# Patient Record
Sex: FEMALE | Race: WHITE | NOT HISPANIC OR LATINO | Employment: OTHER | URBAN - METROPOLITAN AREA
[De-identification: names, ages, dates, MRNs, and addresses within clinical notes are randomized per-mention and may not be internally consistent; named-entity substitution may affect disease eponyms.]

---

## 2021-08-10 ENCOUNTER — OFFICE VISIT (OUTPATIENT)
Dept: ENDOCRINOLOGY | Facility: CLINIC | Age: 61
End: 2021-08-10

## 2021-08-10 VITALS
SYSTOLIC BLOOD PRESSURE: 138 MMHG | BODY MASS INDEX: 47.66 KG/M2 | HEIGHT: 62 IN | OXYGEN SATURATION: 97 % | WEIGHT: 259 LBS | HEART RATE: 109 BPM | DIASTOLIC BLOOD PRESSURE: 86 MMHG

## 2021-08-10 DIAGNOSIS — E11.65 UNCONTROLLED TYPE 2 DIABETES MELLITUS WITH HYPERGLYCEMIA (HCC): Primary | ICD-10-CM

## 2021-08-10 LAB
GLUCOSE BLDC GLUCOMTR-MCNC: 181 MG/DL (ref 70–130)
HBA1C MFR BLD: 7.8 %

## 2021-08-10 PROCEDURE — 99204 OFFICE O/P NEW MOD 45 MIN: CPT | Performed by: INTERNAL MEDICINE

## 2021-08-10 PROCEDURE — 82947 ASSAY GLUCOSE BLOOD QUANT: CPT | Performed by: INTERNAL MEDICINE

## 2021-08-10 RX ORDER — GLYBURIDE 1.25 MG/1
TABLET ORAL
COMMUNITY
Start: 2021-07-21 | End: 2021-08-10

## 2021-08-10 RX ORDER — LORATADINE 10 MG/1
10 TABLET ORAL DAILY
COMMUNITY

## 2021-08-10 RX ORDER — HYDROCODONE BITARTRATE AND ACETAMINOPHEN 10; 325 MG/1; MG/1
TABLET ORAL
COMMUNITY
Start: 2021-07-21

## 2021-08-10 RX ORDER — ALBUTEROL SULFATE 2.5 MG/3ML
2.5 SOLUTION RESPIRATORY (INHALATION) EVERY 4 HOURS PRN
COMMUNITY

## 2021-08-10 RX ORDER — LANCETS 33 GAUGE
EACH MISCELLANEOUS
COMMUNITY
Start: 2021-07-23 | End: 2022-04-06 | Stop reason: SDUPTHER

## 2021-08-10 RX ORDER — NYSTATIN 100000 [USP'U]/G
POWDER TOPICAL
COMMUNITY
Start: 2021-07-19 | End: 2022-10-13

## 2021-08-10 RX ORDER — CLONAZEPAM 0.5 MG/1
TABLET ORAL
COMMUNITY
Start: 2021-06-23

## 2021-08-10 RX ORDER — BLOOD SUGAR DIAGNOSTIC
STRIP MISCELLANEOUS
COMMUNITY
Start: 2021-07-19

## 2021-08-10 RX ORDER — SUCRALFATE ORAL 1 G/10ML
SUSPENSION ORAL
COMMUNITY
Start: 2021-07-19

## 2021-08-10 RX ORDER — ALBUTEROL SULFATE 1.25 MG/3ML
1 SOLUTION RESPIRATORY (INHALATION) EVERY 6 HOURS PRN
COMMUNITY
End: 2022-04-18

## 2021-08-10 RX ORDER — ASPIRIN 81 MG/1
81 TABLET, CHEWABLE ORAL DAILY
COMMUNITY

## 2021-08-10 RX ORDER — HYDROXYZINE HYDROCHLORIDE 25 MG/1
25 TABLET, FILM COATED ORAL 3 TIMES DAILY PRN
COMMUNITY

## 2021-08-10 RX ORDER — CHLORAL HYDRATE 500 MG
CAPSULE ORAL
COMMUNITY

## 2021-08-10 RX ORDER — BUDESONIDE AND FORMOTEROL FUMARATE DIHYDRATE 160; 4.5 UG/1; UG/1
2 AEROSOL RESPIRATORY (INHALATION)
COMMUNITY
End: 2022-04-18 | Stop reason: SDUPTHER

## 2021-08-10 RX ORDER — DOCUSATE SODIUM 100 MG/1
100 CAPSULE, LIQUID FILLED ORAL 2 TIMES DAILY
COMMUNITY

## 2021-08-10 RX ORDER — GLIPIZIDE 5 MG/1
TABLET ORAL
Qty: 30 TABLET | Refills: 5 | Status: SHIPPED | OUTPATIENT
Start: 2021-08-10 | End: 2022-04-05

## 2021-08-10 RX ORDER — PANTOPRAZOLE SODIUM 40 MG/1
TABLET, DELAYED RELEASE ORAL
COMMUNITY
Start: 2021-07-19

## 2021-08-10 RX ORDER — TIOTROPIUM BROMIDE INHALATION SPRAY 3.12 UG/1
SPRAY, METERED RESPIRATORY (INHALATION)
COMMUNITY
Start: 2021-07-19 | End: 2022-04-05

## 2021-08-10 RX ORDER — LISINOPRIL 30 MG/1
20 TABLET ORAL DAILY
COMMUNITY
Start: 2021-07-19 | End: 2022-10-13

## 2021-09-12 PROBLEM — E11.65 UNCONTROLLED TYPE 2 DIABETES MELLITUS WITH HYPERGLYCEMIA (HCC): Status: ACTIVE | Noted: 2021-09-12

## 2022-04-05 ENCOUNTER — OFFICE VISIT (OUTPATIENT)
Dept: ENDOCRINOLOGY | Facility: CLINIC | Age: 62
End: 2022-04-05

## 2022-04-05 VITALS
HEART RATE: 91 BPM | DIASTOLIC BLOOD PRESSURE: 80 MMHG | WEIGHT: 262 LBS | SYSTOLIC BLOOD PRESSURE: 136 MMHG | HEIGHT: 64 IN | OXYGEN SATURATION: 96 % | BODY MASS INDEX: 44.73 KG/M2

## 2022-04-05 DIAGNOSIS — E78.2 MIXED HYPERLIPIDEMIA: ICD-10-CM

## 2022-04-05 DIAGNOSIS — E11.65 UNCONTROLLED TYPE 2 DIABETES MELLITUS WITH HYPERGLYCEMIA: Primary | ICD-10-CM

## 2022-04-05 LAB
EXPIRATION DATE: NORMAL
GLUCOSE BLDC GLUCOMTR-MCNC: 143 MG/DL (ref 70–130)
HBA1C MFR BLD: 9.1 %
Lab: NORMAL

## 2022-04-05 PROCEDURE — 99214 OFFICE O/P EST MOD 30 MIN: CPT | Performed by: INTERNAL MEDICINE

## 2022-04-05 PROCEDURE — 83036 HEMOGLOBIN GLYCOSYLATED A1C: CPT | Performed by: INTERNAL MEDICINE

## 2022-04-05 PROCEDURE — 82947 ASSAY GLUCOSE BLOOD QUANT: CPT | Performed by: INTERNAL MEDICINE

## 2022-04-05 PROCEDURE — 3046F HEMOGLOBIN A1C LEVEL >9.0%: CPT | Performed by: INTERNAL MEDICINE

## 2022-04-05 RX ORDER — METFORMIN HYDROCHLORIDE 500 MG/1
500 TABLET, EXTENDED RELEASE ORAL
Qty: 60 TABLET | Refills: 5 | Status: SHIPPED | OUTPATIENT
Start: 2022-04-05

## 2022-04-05 RX ORDER — INSULIN DEGLUDEC INJECTION 100 U/ML
12 INJECTION, SOLUTION SUBCUTANEOUS NIGHTLY
Qty: 15 ML | Refills: 0
Start: 2022-04-05 | End: 2022-05-02 | Stop reason: SDUPTHER

## 2022-04-05 RX ORDER — FAMOTIDINE 10 MG
10 TABLET ORAL 2 TIMES DAILY
COMMUNITY

## 2022-04-05 RX ORDER — PRAVASTATIN SODIUM 40 MG
40 TABLET ORAL NIGHTLY
Qty: 30 TABLET | Refills: 5 | Status: SHIPPED | OUTPATIENT
Start: 2022-04-05 | End: 2022-10-13

## 2022-04-05 NOTE — PROGRESS NOTES
Chief Complaint   Patient presents with   • Diabetes     Follow up        HPI:   Magdaleno Curtis is a 61 y.o.female who returns to endocrine clinic for follow-up evaluation and management of her Type 2 diabetes. Last visit 08/10/2021. Her history is as follows:    Pt is accompanied by her daughter today.    Interim Events:   - pt missed her 12/2021 appointment. Did not schedule f/u until this year.  - Has had COPD exacerbations requiring glucocorticoid treatment  - Pt has new PCP. Has had worsening glycemic control in the past few months.  Hemoglobin A1c in March 2022 was 9.6%.  Patient's PCP started Januvia 100 mg daily.     1) Type 2 DM with no known associated complications at this time:   - Diagnosed with diabetes in 2020.   - Was initially prescribed plain metformin by a former PCP but pt did not tolerate. She developed abdominal pain.  - She was then prescribed glipizide qAM + glyburide qhs by former PCP at that time. Pt stated she was having symptoms of abdominal pain so she stopped the glipizide but was not sure if the medication was the cause of her symptoms. On further review, Pt had chronic nausea, abdominal pain off of medication.    Initial Endocrine Visit: (08/10/2021)   - pt's Hgb A1C% was 7.8 in 08/2021  - Discussed treatment options. GLP-1 agonists not ideal as she has chronic Abd pain/ nausea.  After further discussion with the patient, she has chronic abdominal pain and nausea that occurred even off of glipizide.  Discussed that the sulfonylureas very unlikely the cause of any of her GI symptoms.  Patient was agreeable to trying low-dose glipizide again as well as samples of Januvia.   - pt missed her 12/2021 appointment. Did not schedule f/u until 2022. Pt did not take the Januvia samples I gave her in 08/2021.    - New PCP started Januvia in 2022. Hemoglobin A1c in March 2022 was 9.6%.     Current DM Medications:  - Januvia 100 mg daily    Glucometer Review:  200's - 300's throughout the  "day    DM Health Maintenance:  Ophtho: due  Podiatry: no recent visit  Monofilament / Foot exam: due  Lipids: (03/2022) Tchol 194, , HDL 38,  - is not on statin  Urine Microalb/Cr ratio: DUE  TSH: (03/2022) 3.030  CMP: (03/2022) Cr 0.82, eGFR 81, LFTs WNL  CBC: (03/2022) Hgb 14.2, WNL  Vit B12: (03/2022) 637    Other history:   - COPD on NC O2    Review of Systems   Constitutional: Positive for fatigue.   HENT: Negative.    Eyes: Negative.    Respiratory: Positive for shortness of breath and wheezing.    Cardiovascular: Negative.    Gastrointestinal: Positive for abdominal pain, constipation and nausea.        Reflux/heartburn   Endocrine: Positive for cold intolerance, heat intolerance, polydipsia and polyuria.   Genitourinary: Negative.    Musculoskeletal: Positive for arthralgias, back pain and myalgias.   Skin: Negative.    Allergic/Immunologic: Negative.    Neurological: Negative.    Hematological: Bruises/bleeds easily.   Psychiatric/Behavioral: Positive for sleep disturbance.        H/o anxiety       The following portions of the patient's history were reviewed and updated as appropriate: allergies, current medications, past family history, past medical history, past social history, past surgical history and problem list.      /80   Pulse 91   Ht 162.6 cm (64\")   Wt 119 kg (262 lb)   SpO2 96%   BMI 44.97 kg/m²   Physical Exam  Vitals reviewed.   Constitutional:       General: She is not in acute distress.     Appearance: She is well-developed. She is obese. She is not diaphoretic.   HENT:      Head: Normocephalic.   Eyes:      Conjunctiva/sclera: Conjunctivae normal.      Pupils: Pupils are equal, round, and reactive to light.   Neck:      Thyroid: No thyromegaly.      Trachea: No tracheal deviation.      Comments: No palpable thyroid nodules    Cardiovascular:      Rate and Rhythm: Normal rate and regular rhythm.      Heart sounds: Normal heart sounds. No murmur heard.  Pulmonary:     "  Effort: Pulmonary effort is normal. No respiratory distress.      Breath sounds: Examination of the right-lower field reveals decreased breath sounds. Examination of the left-lower field reveals decreased breath sounds. Decreased breath sounds present.   Abdominal:      General: Bowel sounds are normal.      Palpations: Abdomen is soft. There is no mass.      Tenderness: There is no abdominal tenderness.   Lymphadenopathy:      Cervical: No cervical adenopathy.   Skin:     General: Skin is warm and dry.      Findings: No erythema.   Neurological:      Mental Status: She is alert and oriented to person, place, and time.      Cranial Nerves: No cranial nerve deficit.   Psychiatric:         Behavior: Behavior normal.       LABS/IMAGING: outside records reviewed and summarized in HPI  Results for orders placed or performed in visit on 04/05/22   POC Glucose, Blood    Specimen: Blood   Result Value Ref Range    Glucose 143 (A) 70 - 130 mg/dL   POC Glycosylated Hemoglobin (Hb A1C)    Specimen: Blood   Result Value Ref Range    Hemoglobin A1C 9.1 %    Lot Number [id number]     Expiration Date 12/10/23        ASSESSMENT/PLAN:  1) Type 2 DM with no known associated complications at this time: uncontrolled with hyperglycemia, A1C% 9.1 today  - - I counseled pt on potential microvascular and macrovascular complications from uncontrolled diabetes; the significant positive effect carb consistent meal planning and modest weight loss can have on glycemic control; blood glucose goals for complication prevention; and mechanism of action of various diabetic medications    - Discussed treatment options. GLP-1 agonists not ideal as she has chronic Abd pain. Pt was agreeable to starting low dose metformin ER formulation.    - Continue Januvia 100 mg daily   - Start metformin  mg daily with dinner. If tolerated, will increase to 1 tab BID with food after 3-4 weeks  - Starting Tresiba (U-100) 12 units nightly  - Pt to call with BG  readings in 2-3 weeks    - Pt now on insulin and would benefit from CGM. Will send Rx to DME  - Recent lab results from PCP's office reviewed and summarized in HPI    2) mixed hyperlipidemia:  -Lipid panel from March 2022 reviewed  -Discussed with patient current AHA recommendations for statin use for primary prevention of CVD in patients with diabetes over the age of 40.   -Patient was agreeable to starting pravastatin 40 mg nightly  - Rx sent to pharmacy.    RTC 4 months.    Counseling was given to patient for the following topics:  instructions for management, impressions and risks and benefits of treatment options, see details in assessment/plan. Total face to face time of the encounter was 30 minutes and 25 minutes was spent counseling.    Signed: Tiffany Bennett MD

## 2022-04-06 ENCOUNTER — TELEPHONE (OUTPATIENT)
Dept: ENDOCRINOLOGY | Facility: CLINIC | Age: 62
End: 2022-04-06

## 2022-04-06 RX ORDER — PROCHLORPERAZINE 25 MG/1
1 SUPPOSITORY RECTAL
Qty: 1 EACH | Refills: 3 | Status: SHIPPED | OUTPATIENT
Start: 2022-04-06 | End: 2022-04-07

## 2022-04-06 RX ORDER — LANCETS 33 GAUGE
1 EACH MISCELLANEOUS 3 TIMES DAILY
Qty: 100 EACH | Refills: 5 | Status: SHIPPED | OUTPATIENT
Start: 2022-04-06

## 2022-04-06 RX ORDER — PROCHLORPERAZINE 25 MG/1
1 SUPPOSITORY RECTAL DAILY
Qty: 1 EACH | Refills: 0 | Status: SHIPPED | OUTPATIENT
Start: 2022-04-06 | End: 2022-04-18

## 2022-04-06 RX ORDER — PROCHLORPERAZINE 25 MG/1
SUPPOSITORY RECTAL
Qty: 3 EACH | Refills: 5 | Status: SHIPPED | OUTPATIENT
Start: 2022-04-06 | End: 2022-04-11 | Stop reason: CLARIF

## 2022-04-06 NOTE — TELEPHONE ENCOUNTER
PATIENT NEEDS REFILL ON ONE TOUCH ULTRA LANCETS AND TEST STRIPS.     PATIENT IS ALSO INTERESTED IN DEXCOM AND WOULD LIKE TO KNOW IF HER INSURANCE MAY COVER THIS.

## 2022-04-11 ENCOUNTER — TELEPHONE (OUTPATIENT)
Dept: ENDOCRINOLOGY | Facility: CLINIC | Age: 62
End: 2022-04-11

## 2022-04-11 NOTE — TELEPHONE ENCOUNTER
PATIENT IS CALLING STATING HER INSURANCE DENIED THE DEXCOM. THEY WILL COVER FREE STYLE YOVANY. SHE NEEDS US TO SEND A NEW PRESCRIPTION FOR FREE STYLE YOVANY TO HER PHARMACY. SHE ALSO FAXED HER BLOOD SUGAR READING THIS MORNING TO OUR OFFICE.

## 2022-04-18 ENCOUNTER — OFFICE VISIT (OUTPATIENT)
Dept: PULMONOLOGY | Facility: CLINIC | Age: 62
End: 2022-04-18

## 2022-04-18 VITALS
BODY MASS INDEX: 44.9 KG/M2 | DIASTOLIC BLOOD PRESSURE: 60 MMHG | RESPIRATION RATE: 20 BRPM | OXYGEN SATURATION: 93 % | WEIGHT: 263 LBS | HEART RATE: 100 BPM | HEIGHT: 64 IN | TEMPERATURE: 98.4 F | SYSTOLIC BLOOD PRESSURE: 114 MMHG

## 2022-04-18 DIAGNOSIS — J41.1 MUCOPURULENT CHRONIC BRONCHITIS: Primary | ICD-10-CM

## 2022-04-18 DIAGNOSIS — Z00.6 EXAMINATION FOR NORMAL COMPARISON OR CONTROL IN CLINICAL RESEARCH: Primary | ICD-10-CM

## 2022-04-18 DIAGNOSIS — R00.2 PALPITATIONS: ICD-10-CM

## 2022-04-18 DIAGNOSIS — J96.11 CHRONIC RESPIRATORY FAILURE WITH HYPOXIA: ICD-10-CM

## 2022-04-18 DIAGNOSIS — Z72.0 TOBACCO ABUSE: ICD-10-CM

## 2022-04-18 DIAGNOSIS — R91.1 LUNG NODULE: ICD-10-CM

## 2022-04-18 PROCEDURE — 99205 OFFICE O/P NEW HI 60 MIN: CPT | Performed by: INTERNAL MEDICINE

## 2022-04-18 PROCEDURE — 94618 PULMONARY STRESS TESTING: CPT | Performed by: INTERNAL MEDICINE

## 2022-04-18 RX ORDER — OLOPATADINE HYDROCHLORIDE 1 MG/ML
1 SOLUTION/ DROPS OPHTHALMIC AS NEEDED
COMMUNITY
Start: 2022-04-04

## 2022-04-18 RX ORDER — BUDESONIDE, GLYCOPYRROLATE, AND FORMOTEROL FUMARATE 160; 9; 4.8 UG/1; UG/1; UG/1
2 AEROSOL, METERED RESPIRATORY (INHALATION) 2 TIMES DAILY
Qty: 10.7 G | Refills: 11 | Status: SHIPPED | OUTPATIENT
Start: 2022-04-18 | End: 2022-10-13

## 2022-04-18 RX ORDER — FLUTICASONE PROPIONATE 50 MCG
1 SPRAY, SUSPENSION (ML) NASAL AS NEEDED
COMMUNITY
Start: 2022-04-04

## 2022-04-18 RX ORDER — ONDANSETRON HYDROCHLORIDE 8 MG/1
8 TABLET, FILM COATED ORAL AS NEEDED
COMMUNITY
Start: 2022-04-04

## 2022-04-18 RX ORDER — BUDESONIDE AND FORMOTEROL FUMARATE DIHYDRATE 160; 4.5 UG/1; UG/1
2 AEROSOL RESPIRATORY (INHALATION) EVERY 12 HOURS SCHEDULED
COMMUNITY
End: 2022-04-18

## 2022-04-18 RX ORDER — MELOXICAM 15 MG/1
15 TABLET ORAL AS NEEDED
COMMUNITY
Start: 2022-04-04

## 2022-04-18 RX ORDER — GUAIFENESIN 600 MG/1
600 TABLET ORAL 2 TIMES DAILY PRN
COMMUNITY
Start: 2022-03-10

## 2022-04-18 NOTE — PROGRESS NOTES
New Patient Pulmonary Office Visit      Patient Name: Magdaleno Curtis    Referring Physician: Phillip Keith APRN    Chief Complaint:    Chief Complaint   Patient presents with   • Establish Care              History of Present Illness: Magdaleno Curtis is a 61 y.o. female who is here today to establish care with Pulmonary.  Patient has a past medical history significant for acid reflux, CVA, allergic rhinitis, diabetes mellitus type 2, hypertension, and anxiety.  He was referred to pulmonary for evaluation of her COPD and right upper lobe lung nodule.  She states that she originally saw a pulmonologist [facility], unfortunately her previous pulmonologist which she did like left and she decided that she did not care for the new pulmonologist that she had seen.  She therefore wanted second opinion.  She states that she is smoked for many years over the course of her life and has been getting lung cancer screening CT scans which have always been normal until the most recent 1 when they mentioned to 4 mm right upper lobe noncalcified nodules.  She also notes that she has a longstanding history of COPD and continues to smoke on a daily basis, she is not ready to quit at this time.  She has been on Symbicort.  She notes that when she has tried other inhalers there powder-based they will cause her to get thrush.  She also informed me that she has been utilizing oxygen and previously was prescribed by her pulmonologist, and needs to move it over to our office.  She is currently utilizing her 's oxygen tank.  She has no other acute complaints    Review of Systems:   Review of Systems   Constitutional: Negative for activity change, appetite change, chills and diaphoresis.   HENT: Negative for congestion, postnasal drip, sinus pressure and voice change.    Eyes: Negative for blurred vision.   Respiratory: Positive for cough, shortness of breath and wheezing.    Cardiovascular: Positive for palpitations.  Negative for chest pain.   Gastrointestinal: Negative for abdominal pain.   Musculoskeletal: Negative for myalgias.   Skin: Negative for color change and dry skin.   Allergic/Immunologic: Negative for environmental allergies.   Neurological: Negative for weakness and confusion.   Hematological: Negative for adenopathy.   Psychiatric/Behavioral: Negative for sleep disturbance and depressed mood.       Past Medical History:   Past Medical History:   Diagnosis Date   • Arthritis    • Back problem    • Bladder infection    • Bronchiolitis    • Emphysema lung (HCC)    • High blood pressure    • Pneumonia    • Stroke (HCC)    • Type 2 diabetes mellitus (HCC)        Past Surgical History:   Past Surgical History:   Procedure Laterality Date   • HYSTERECTOMY         Family History:   Family History   Problem Relation Age of Onset   • Diabetes Mother    • Heart disease Mother    • Kidney disease Mother    • Arthritis Father    • COPD Father    • Heart disease Father    • Hypertension Father    • Cancer Maternal Grandmother    • Thyroid cancer Other        Social History:   Social History     Socioeconomic History   • Marital status:    Tobacco Use   • Smoking status: Current Every Day Smoker     Packs/day: 1.00     Types: Cigarettes   • Smokeless tobacco: Never Used   • Tobacco comment: 40 years   Substance and Sexual Activity   • Alcohol use: Not Currently   • Drug use: Never   • Sexual activity: Defer       Medications:     Current Outpatient Medications:   •  albuterol (PROVENTIL) (2.5 MG/3ML) 0.083% nebulizer solution, Take 2.5 mg by nebulization Every 4 (Four) Hours As Needed for Wheezing., Disp: , Rfl:   •  aspirin 81 MG chewable tablet, Chew 81 mg Daily., Disp: , Rfl:   •  clonazePAM (KlonoPIN) 0.5 MG tablet, TAKE ONE TABELT BY MOUTH TWICE DAILY AT BEDTIME, Disp: , Rfl:   •  Continuous Blood Gluc  (FreeStyle Harper 2 [place]) device, 1 each Daily., Disp: 1 each, Rfl: 0  •  Continuous Blood Gluc Sensor  (FreeStyle Harper 2 Sensor) misc, 1 each Every 14 (Fourteen) Days., Disp: 2 each, Rfl: 5  •  docusate sodium (COLACE) 100 MG capsule, Take 100 mg by mouth 2 (Two) Times a Day., Disp: , Rfl:   •  Ergocalciferol (VITAMIN D2 PO), Take 50,000 Units by mouth., Disp: , Rfl:   •  famotidine (PEPCID) 10 MG tablet, Take 10 mg by mouth 2 (Two) Times a Day., Disp: , Rfl:   •  fluticasone (FLONASE) 50 MCG/ACT nasal spray, 1 spray into the nostril(s) as directed by provider As Needed., Disp: , Rfl:   •  HYDROcodone-acetaminophen (NORCO)  MG per tablet, , Disp: , Rfl:   •  insulin degludec (Tresiba FlexTouch) 100 UNIT/ML solution pen-injector injection, Inject 12 Units under the skin into the appropriate area as directed Every Night., Disp: 15 mL, Rfl: 0  •  Lancets (OneTouch Delica Plus [id number]) misc, Inject 1 each under the skin into the appropriate area as directed 3 (Three) Times a Day., Disp: 100 each, Rfl: 5  •  lisinopril (PRINIVIL,ZESTRIL) 30 MG tablet, Take 20 mg by mouth Daily., Disp: , Rfl:   •  loratadine (CLARITIN) 10 MG tablet, Take 10 mg by mouth Daily., Disp: , Rfl:   •  metFORMIN ER (GLUCOPHAGE-XR) 500 MG 24 hr tablet, Take 1 tablet by mouth 2 (Two) Times a Day Before Meals., Disp: 60 tablet, Rfl: 5  •  nystatin (MYCOSTATIN) 691285 UNIT/GM powder, , Disp: , Rfl:   •  O2 (OXYGEN), oxygen  2LT Nasal Canula, Disp: , Rfl:   •  Omega-3 Fatty Acids (fish oil) 1000 MG capsule capsule, Take  by mouth Daily With Breakfast., Disp: , Rfl:   •  OneTouch Ultra test strip, , Disp: , Rfl:   •  pantoprazole (PROTONIX) 40 MG EC tablet, , Disp: , Rfl:   •  pravastatin (PRAVACHOL) 40 MG tablet, Take 1 tablet by mouth Every Night., Disp: 30 tablet, Rfl: 5  •  Probiotic Product (PROBIOTIC-10 PO), Take  by mouth., Disp: , Rfl:   •  sertraline (ZOLOFT) 50 MG tablet, Take 100 mg by mouth Daily., Disp: , Rfl:   •  SITagliptin (Januvia) 100 MG tablet, Take 1 tablet by mouth Daily., Disp: 30 tablet, Rfl: 0  •  SM Mucus Relief 600 MG  "12 hr tablet, Take 600 mg by mouth 2 (Two) Times a Day As Needed., Disp: , Rfl:   •  sucralfate (CARAFATE) 1 GM/10ML suspension, , Disp: , Rfl:   •  Budeson-Glycopyrrol-Formoterol (Breztri Aerosphere) 160-9-4.8 MCG/ACT aerosol inhaler, Inhale 2 puffs 2 (Two) Times a Day., Disp: 10.7 g, Rfl: 11  •  hydrOXYzine (ATARAX) 25 MG tablet, Take 25 mg by mouth 3 (Three) Times a Day As Needed for Itching., Disp: , Rfl:   •  meloxicam (MOBIC) 15 MG tablet, Take 15 mg by mouth As Needed., Disp: , Rfl:   •  olopatadine (PATANOL) 0.1 % ophthalmic solution, Administer 1 drop to both eyes As Needed., Disp: , Rfl:   •  ondansetron (ZOFRAN) 8 MG tablet, Take 8 mg by mouth As Needed., Disp: , Rfl:     Allergies:   Allergies   Allergen Reactions   • Codeine Nausea And Vomiting       Physical Exam:  Vital Signs:   Vitals:    04/18/22 1419   BP: 114/60   BP Location: Left arm   Patient Position: Sitting   Cuff Size: Adult   Pulse: 100   Resp: 20   Temp: 98.4 °F (36.9 °C)   TempSrc: Infrared   SpO2: 93%  Comment: 2 liters   Weight: 119 kg (263 lb)   Height: 162.6 cm (64\")   PainSc:   6   PainLoc: Back  Comment: lower       Physical Exam  Vitals and nursing note reviewed.   Constitutional:       General: She is not in acute distress.     Appearance: She is well-developed. She is obese. She is not ill-appearing or toxic-appearing.   HENT:      Head: Normocephalic and atraumatic.   Cardiovascular:      Rate and Rhythm: Normal rate and regular rhythm.      Pulses: Normal pulses.      Heart sounds: Normal heart sounds. No murmur heard.    No friction rub. No gallop.   Pulmonary:      Effort: Pulmonary effort is normal. No respiratory distress.      Breath sounds: Normal breath sounds. No wheezing, rhonchi or rales.   Musculoskeletal:      Right lower leg: No edema.      Left lower leg: No edema.   Skin:     General: Skin is warm and dry.   Neurological:      Mental Status: She is alert and oriented to person, place, and time.           There " is no immunization history on file for this patient.    Results Review:   - I personally reviewed the pts imaging from chest x-ray 4/18/2022 shows no acute cardiopulmonary process  -I personally viewed the patient's 6-minute walk test from 4/18/2022 when she was able walk 243 m which is 64% of predicted.  Oxygen saturation ranging 95 to 98% throughout the test.  Dyspnea scale started at 1 and escalated up to 9.  - I personally reviewed the pts chart with regard to evaluation by her PCP.    Assessment / Plan:   1. Mucopurulent chronic bronchitis (HCC) (Primary)  -Patient with a history of COPD, did not have most recent testing although I feel that this is a very likely diagnosis.  -Continue albuterol, and then I will switch her Symbicort over to Breztri for medication management  - latest 6-minute walk test as noted above  -PFTs to be performed at the next visit  -Patient counseled on monitoring for COPD exacerbation and treatment plan    2. Two 4 mm right upper lobe noncalcified nodules 11/2021  -With a 4 mm nodule, this does not require any increased CT sans I would recommend that we do a follow-up scan at least at 1 year though.  If the patient wants we can also consider doing a 6 months but no sooner than that.  She did verbalize understanding of this.  I did review the CT scan report.  She is going to bring all of her CT scans to the next visit so I can compare them all and sure that this nodule was not there on previous CT scans and just not mentioned.    3. Chronic respiratory failure with hypoxia (HCC)  -Has been utilizing 2 L nasal cannula although at today's visit on the 6-minute walk test on room air she did not desaturate.  This could have been due to the fact that she was wearing her oxygen directly before starting the test.  She is going to retest herself with her PCPs office and depending on what that shows have that faxed to us so that we can set her up with home oxygen if she needs it.    4. Tobacco  abuse  -I did  her on tobacco cessation.  She is going to work on identifying a reason to quit, identifying the causes of her tobacco abuse, and then identifying her means of replacing her triggers.  No prescriptions were sent on today's visit for tobacco cessation.  She was not quite ready to quit.    5. Palpitations  -She has been wearing her recent Holter monitor.  Plans to follow-up with a cardiologist in the next few weeks.  I did highly recommend she keep that as that could be playing a role in her shortness of breath outside of just COPD.    Level of service justified based on 60 minutes spent in patient care on this date of service including, but not limited to: preparing to see the patient, obtaining and/or reviewing history, performing medically appropriate examination, ordering tests/medicine/procedures, independently interpreting results, documenting clinical information in EHR, and counseling/education of patient/family/caregiver. (Level 4 45-59 minutes; Level 5 60-74 minutes)    Follow Up:   Return in about 3 months (around 7/18/2022) for PFTs.     ANGELA Pimentel,   Pulmonary and Critical Care Medicine  Note Electronically Signed    Part of this note may be an electronic transcription/translation of spoken language to printed text using the Dragon Dictation System.      Addendum:  I received report from the patient's primary care physician who performed a secondary walk test on the patient on 4/20/2022.  It was noted on that test that the patient underwent a walking oximetry where she started at 94% and dropped down to 86% on room air she was placed on 2 L nasal cannula and her oxygen level increased up to 94%.  I suspect that our testing that was done here was an accurate due to the fact that she was wearing oxygen right up until the time that we did her testing and was over saturated.  With the new test results.  It is reasonable to start the patient on oxygen I will go ahead and order the  patient 2 L of cannula to be used with exertion and with sleep.    Electronically signed by Omar Pimentel DO, 04/25/22, 5:12 PM EDT.

## 2022-04-21 ENCOUNTER — TELEPHONE (OUTPATIENT)
Dept: ENDOCRINOLOGY | Facility: CLINIC | Age: 62
End: 2022-04-21

## 2022-04-21 NOTE — TELEPHONE ENCOUNTER
I have her DME form filled out and can send it in for Dexcom. I just have to send her last office note with it.

## 2022-04-21 NOTE — TELEPHONE ENCOUNTER
Pt called states she has been using free style sharad 2 sensor pt states she had to change twice her arm is really bruised and sore. Pt states she would like to try something else. Pt last seen 04/05/22 next f/u 08/24/22

## 2022-04-25 ENCOUNTER — TELEPHONE (OUTPATIENT)
Dept: PULMONOLOGY | Facility: CLINIC | Age: 62
End: 2022-04-25

## 2022-04-25 NOTE — TELEPHONE ENCOUNTER
Pt called today requesting a call back @ [phone] regarding here inhaler that she has recently stopped taking due to not helping. Pt c/o sob/O2 levels dropping.

## 2022-04-26 ENCOUNTER — TELEPHONE (OUTPATIENT)
Dept: ENDOCRINOLOGY | Facility: CLINIC | Age: 62
End: 2022-04-26

## 2022-04-26 DIAGNOSIS — E11.65 UNCONTROLLED TYPE 2 DIABETES MELLITUS WITH HYPERGLYCEMIA: Primary | ICD-10-CM

## 2022-04-26 RX ORDER — INSULIN LISPRO 100 [IU]/ML
INJECTION, SOLUTION INTRAVENOUS; SUBCUTANEOUS
Qty: 15 ML | Refills: 0 | Status: SHIPPED | OUTPATIENT
Start: 2022-04-26 | End: 2022-07-26

## 2022-04-26 RX ORDER — INSULIN ASPART 100 [IU]/ML
INJECTION, SOLUTION INTRAVENOUS; SUBCUTANEOUS
Qty: 15 ML | Refills: 0 | Status: SHIPPED | OUTPATIENT
Start: 2022-04-26

## 2022-04-26 RX ORDER — PEN NEEDLE, DIABETIC 30 GX3/16"
1 NEEDLE, DISPOSABLE MISCELLANEOUS 3 TIMES DAILY
Qty: 100 EACH | Refills: 3 | Status: SHIPPED | OUTPATIENT
Start: 2022-04-26

## 2022-04-26 NOTE — TELEPHONE ENCOUNTER
PATIENT STATES THAT SHE WAS RECENTLY PRESCRIBED ANTIBIOTICS AND STEROIDS FOR SHORT OR AIR AND WHEEZING. SHE STATES THAT SHE JUST CHECKED HER GLUCOSE AND GOT A READING . PATIENT IS TAKING TRESIBA, METFORMIN AND JANUVIA. SHE WOULD LIKE TO BE ADVISED ON HOW SHE MIGHT BE ABLE TO GET HER READINGS DOWN WHILE TAKING STEROIDS. IF ANYTHING IS NEEDED TO BE PRESCRIBED, HER PHARMACY CLOSES AT 4:30 PM.     CALL BACK [phone]

## 2022-04-26 NOTE — TELEPHONE ENCOUNTER
Pt notified and voiced understanding. Changed to Humalog per pharmacy. Pt will call back if pharmacy closes before she gets insulin.

## 2022-04-26 NOTE — TELEPHONE ENCOUNTER
While on steroids:    - Have pt increase Tresiba to 15 units nightly    Novolog: Take 4 units with each meal + the scale below.    If not eating a meal, take just the scale.  Keep Novolog injections at least 3 hours apart.    Scale:  If BG < 150: 0 units  If  - 200: 1 units  If  - 250: 2 units  If  - 300: 3 units  If  - 350: 4 units  If BG > 350: 5 units

## 2022-05-02 DIAGNOSIS — E11.65 UNCONTROLLED TYPE 2 DIABETES MELLITUS WITH HYPERGLYCEMIA: ICD-10-CM

## 2022-05-02 RX ORDER — INSULIN DEGLUDEC INJECTION 100 U/ML
12 INJECTION, SOLUTION SUBCUTANEOUS NIGHTLY
Qty: 15 ML | Refills: 5
Start: 2022-05-02 | End: 2022-05-09 | Stop reason: SDUPTHER

## 2022-05-02 NOTE — TELEPHONE ENCOUNTER
Patient called wanting to know if there was any updates on getting her dexcom approved. Please advise.

## 2022-05-02 NOTE — TELEPHONE ENCOUNTER
Spoke with paul and they are working on her order. Pt notified and asked for a refill of tresiba. Script pended.

## 2022-05-09 DIAGNOSIS — E11.65 UNCONTROLLED TYPE 2 DIABETES MELLITUS WITH HYPERGLYCEMIA: ICD-10-CM

## 2022-05-09 RX ORDER — INSULIN DEGLUDEC INJECTION 100 U/ML
12 INJECTION, SOLUTION SUBCUTANEOUS NIGHTLY
Qty: 15 ML | Refills: 5 | Status: SHIPPED | OUTPATIENT
Start: 2022-05-09 | End: 2022-10-13

## 2022-05-09 NOTE — TELEPHONE ENCOUNTER
Will try to resend it. Set on NO Print    E-Prescribing Status      Outpatient Medication Detail    insulin degludec (Tresiba FlexTouch) 100 UNIT/ML solution pen-injector injection        Sig: Inject 12 Units under the skin into the appropriate area as directed Every Night.        Class: No Print        Route: Subcutaneous

## 2022-05-09 NOTE — TELEPHONE ENCOUNTER
Pt called requesting a prescription for Tresiba flextouch 100 unit/mL solution pen injector. Prescription on 05/02/22 never sent to pharmacy. Pt last seen 04/05/22 pt next f/u 08/24/22

## 2022-05-12 ENCOUNTER — TELEPHONE (OUTPATIENT)
Dept: ENDOCRINOLOGY | Facility: CLINIC | Age: 62
End: 2022-05-12

## 2022-06-13 ENCOUNTER — TELEPHONE (OUTPATIENT)
Dept: ENDOCRINOLOGY | Facility: CLINIC | Age: 62
End: 2022-06-13

## 2022-06-13 NOTE — TELEPHONE ENCOUNTER
CALL BACK [phone]    [facility] CALLED WANTING TO KNOW IF PRIOR AUTH FOR DEXCOM HAS BEEN STARTED.

## 2022-06-13 NOTE — TELEPHONE ENCOUNTER
Spoke with Jacky. They were running this under pharmacy. Pharmacy will not cover, that's why we sent DME. Jacky is changing it to run under medical.

## 2022-07-26 RX ORDER — INSULIN LISPRO 100 [IU]/ML
INJECTION, SOLUTION INTRAVENOUS; SUBCUTANEOUS
Qty: 15 ML | Refills: 0 | Status: SHIPPED | OUTPATIENT
Start: 2022-07-26

## 2022-10-13 ENCOUNTER — OFFICE VISIT (OUTPATIENT)
Dept: PULMONOLOGY | Facility: CLINIC | Age: 62
End: 2022-10-13

## 2022-10-13 VITALS
BODY MASS INDEX: 44.63 KG/M2 | HEART RATE: 114 BPM | RESPIRATION RATE: 18 BRPM | SYSTOLIC BLOOD PRESSURE: 134 MMHG | HEIGHT: 64 IN | DIASTOLIC BLOOD PRESSURE: 60 MMHG | WEIGHT: 261.4 LBS | OXYGEN SATURATION: 95 % | TEMPERATURE: 98.2 F

## 2022-10-13 DIAGNOSIS — J96.11 CHRONIC RESPIRATORY FAILURE WITH HYPOXIA: Primary | ICD-10-CM

## 2022-10-13 DIAGNOSIS — Z72.0 TOBACCO ABUSE: ICD-10-CM

## 2022-10-13 DIAGNOSIS — R91.1 LUNG NODULE: ICD-10-CM

## 2022-10-13 DIAGNOSIS — E66.01 OBESITY, CLASS III, BMI 40-49.9 (MORBID OBESITY): ICD-10-CM

## 2022-10-13 DIAGNOSIS — J41.1 MUCOPURULENT CHRONIC BRONCHITIS: ICD-10-CM

## 2022-10-13 PROBLEM — E66.813 OBESITY, CLASS III, BMI 40-49.9 (MORBID OBESITY): Status: ACTIVE | Noted: 2022-10-13

## 2022-10-13 PROCEDURE — 94375 RESPIRATORY FLOW VOLUME LOOP: CPT | Performed by: INTERNAL MEDICINE

## 2022-10-13 PROCEDURE — 99215 OFFICE O/P EST HI 40 MIN: CPT | Performed by: INTERNAL MEDICINE

## 2022-10-13 PROCEDURE — 94726 PLETHYSMOGRAPHY LUNG VOLUMES: CPT | Performed by: INTERNAL MEDICINE

## 2022-10-13 PROCEDURE — 94729 DIFFUSING CAPACITY: CPT | Performed by: INTERNAL MEDICINE

## 2022-10-13 RX ORDER — PREDNISONE 10 MG/1
TABLET ORAL
Qty: 31 TABLET | Refills: 0 | Status: SHIPPED | OUTPATIENT
Start: 2022-10-13

## 2022-10-13 RX ORDER — LISINOPRIL 20 MG/1
TABLET ORAL
COMMUNITY
Start: 2022-10-06

## 2022-10-13 RX ORDER — INSULIN GLARGINE 100 [IU]/ML
INJECTION, SOLUTION SUBCUTANEOUS
COMMUNITY
Start: 2022-09-07

## 2022-10-13 RX ORDER — SERTRALINE HYDROCHLORIDE 100 MG/1
TABLET, FILM COATED ORAL
COMMUNITY
Start: 2022-08-08

## 2022-10-13 RX ORDER — IPRATROPIUM BROMIDE AND ALBUTEROL SULFATE 2.5; .5 MG/3ML; MG/3ML
SOLUTION RESPIRATORY (INHALATION)
COMMUNITY
Start: 2022-10-04

## 2022-10-13 RX ORDER — GABAPENTIN 300 MG/1
CAPSULE ORAL
COMMUNITY
Start: 2022-10-03

## 2022-10-13 RX ORDER — OMEGA-3S/DHA/EPA/FISH OIL/D3 300MG-1000
CAPSULE ORAL
COMMUNITY
Start: 2022-07-26

## 2022-10-13 RX ORDER — DOXYCYCLINE HYCLATE 100 MG/1
100 CAPSULE ORAL 2 TIMES DAILY
Qty: 20 CAPSULE | Refills: 0 | Status: SHIPPED | OUTPATIENT
Start: 2022-10-13

## 2022-10-13 RX ORDER — FORMOTEROL FUMARATE 20 UG/2ML
SOLUTION RESPIRATORY (INHALATION)
COMMUNITY
Start: 2022-07-26

## 2022-10-13 RX ORDER — NICOTINE 21 MG/24HR
1 PATCH, TRANSDERMAL 24 HOURS TRANSDERMAL EVERY 24 HOURS
Qty: 14 EACH | Refills: 0 | Status: SHIPPED | OUTPATIENT
Start: 2022-10-13

## 2022-10-13 NOTE — PROGRESS NOTES
"  PULMONARY  NOTE    Chief Complaint     Chronic bronchitis, tobacco abuse, chronic respiratory failure, obesity class III    History of Present Illness     62-year-old female returns today for follow-up  She initially saw Dr. Pimentel in the office in April 2022    She has a long history of tobacco abuse which is ongoing  She is \"cutting down\" but does not have plans for smoking cessation    She has chronic respiratory failure  She is on supplemental oxygen now 24 hours a day  She has both a stationary and portable concentrator    She has frequent exacerbations of COPD  She indicates that for the last several days she has had increasing cough, wheezing, and green sputum production  She has not had hemoptysis    She has been unable to follow any kind of regular exercise program or achieve weight loss    Last LDCT was in November 2021 which revealed no suspicious intrathoracic lesions    Patient Active Problem List   Diagnosis   • Uncontrolled type 2 diabetes mellitus with hyperglycemia (HCC)   • Mucopurulent chronic bronchitis (HCC)   • Two 4 mm right upper lobe noncalcified nodules 11/2021   • Chronic respiratory failure with hypoxia (HCC)   • Tobacco abuse   • Obesity, Class III, BMI 40-49.9 (morbid obesity) (Formerly [facility])     Allergies   Allergen Reactions   • Codeine Nausea And Vomiting       Current Outpatient Medications:   •  albuterol (PROVENTIL) (2.5 MG/3ML) 0.083% nebulizer solution, Take 2.5 mg by nebulization Every 4 (Four) Hours As Needed for Wheezing., Disp: , Rfl:   •  aspirin 81 MG chewable tablet, Chew 81 mg Daily., Disp: , Rfl:   •  cholecalciferol (VITAMIN D3) 10 MCG (400 UNIT) tablet, , Disp: , Rfl:   •  clonazePAM (KlonoPIN) 0.5 MG tablet, TAKE ONE TABELT BY MOUTH TWICE DAILY AT BEDTIME, Disp: , Rfl:   •  Continuous Blood Gluc  (FreeStyle Harper 2 Wingate) device, 1 each Daily., Disp: 1 each, Rfl: 0  •  Continuous Blood Gluc Sensor (FreeStyle Harper 2 Sensor) misc, 1 each Every 14 (Fourteen) Days., " Disp: 2 each, Rfl: 5  •  docusate sodium (COLACE) 100 MG capsule, Take 100 mg by mouth 2 (Two) Times a Day., Disp: , Rfl:   •  famotidine (PEPCID) 10 MG tablet, Take 10 mg by mouth 2 (Two) Times a Day., Disp: , Rfl:   •  fluticasone (FLONASE) 50 MCG/ACT nasal spray, 1 spray into the nostril(s) as directed by provider As Needed., Disp: , Rfl:   •  formoterol (PERFOROMIST) 20 MCG/2ML nebulizer solution, , Disp: , Rfl:   •  gabapentin (NEURONTIN) 300 MG capsule, , Disp: , Rfl:   •  HYDROcodone-acetaminophen (NORCO)  MG per tablet, , Disp: , Rfl:   •  hydrOXYzine (ATARAX) 25 MG tablet, Take 25 mg by mouth 3 (Three) Times a Day As Needed for Itching., Disp: , Rfl:   •  insulin aspart (NovoLOG FlexPen) 100 UNIT/ML solution pen-injector sc pen, 4 units TID ac meals plus extra as directed up to 30 units per day, Disp: 15 mL, Rfl: 0  •  Insulin Lispro, 1 Unit Dial, (HUMALOG) 100 UNIT/ML solution pen-injector, INJECT 4 UNITS 3 TIMES A DAY BEFORE MEALS PLUS EXTRA AS DIRECTED, UP TO 30 UNITS DAILY, Disp: 15 mL, Rfl: 0  •  Insulin Pen Needle (Pen Needles) 32G X 4 MM misc, 1 each 3 (Three) Times a Day., Disp: 100 each, Rfl: 3  •  ipratropium-albuterol (DUO-NEB) 0.5-2.5 mg/3 ml nebulizer, , Disp: , Rfl:   •  Lancets (OneTouch Delica Plus Kifnzz17M) misc, Inject 1 each under the skin into the appropriate area as directed 3 (Three) Times a Day., Disp: 100 each, Rfl: 5  •  Lantus SoloStar 100 UNIT/ML injection pen, , Disp: , Rfl:   •  lisinopril (PRINIVIL,ZESTRIL) 20 MG tablet, , Disp: , Rfl:   •  loratadine (CLARITIN) 10 MG tablet, Take 10 mg by mouth Daily., Disp: , Rfl:   •  meloxicam (MOBIC) 15 MG tablet, Take 15 mg by mouth As Needed., Disp: , Rfl:   •  metFORMIN ER (GLUCOPHAGE-XR) 500 MG 24 hr tablet, Take 1 tablet by mouth 2 (Two) Times a Day Before Meals., Disp: 60 tablet, Rfl: 5  •  nystatin (MYCOSTATIN) 100,000 unit/mL suspension, , Disp: , Rfl:   •  O2 (OXYGEN), oxygen  2LT Nasal Canula, Disp: , Rfl:   •  olopatadine  (PATANOL) 0.1 % ophthalmic solution, Administer 1 drop to both eyes As Needed., Disp: , Rfl:   •  Omega-3 Fatty Acids (fish oil) 1000 MG capsule capsule, Take  by mouth Daily With Breakfast., Disp: , Rfl:   •  ondansetron (ZOFRAN) 8 MG tablet, Take 8 mg by mouth As Needed., Disp: , Rfl:   •  OneTouch Ultra test strip, , Disp: , Rfl:   •  pantoprazole (PROTONIX) 40 MG EC tablet, , Disp: , Rfl:   •  Probiotic Product (PROBIOTIC-10 PO), Take  by mouth., Disp: , Rfl:   •  sertraline (ZOLOFT) 100 MG tablet, , Disp: , Rfl:   •  SM Mucus Relief 600 MG 12 hr tablet, Take 600 mg by mouth 2 (Two) Times a Day As Needed., Disp: , Rfl:   •  sucralfate (CARAFATE) 1 GM/10ML suspension, , Disp: , Rfl:   •  doxycycline (VIBRAMYCIN) 100 MG capsule, Take 1 capsule by mouth 2 (Two) Times a Day., Disp: 20 capsule, Rfl: 0  •  nicotine (Nicoderm CQ) 14 MG/24HR patch, Place 1 patch on the skin as directed by provider Daily., Disp: 14 each, Rfl: 0  •  nicotine (Nicoderm CQ) 21 MG/24HR patch, Place 1 patch on the skin as directed by provider Daily., Disp: 14 each, Rfl: 0  •  nicotine (Nicoderm CQ) 7 MG/24HR patch, Place 1 patch on the skin as directed by provider Daily., Disp: 14 each, Rfl: 0  •  predniSONE (DELTASONE) 10 MG tablet, Take 3 tabs daily x 4 days, then take 2 tabs daily x 4 days, then take 1 tab daily x 4 days, Disp: 31 tablet, Rfl: 0  MEDICATION LIST AND ALLERGIES REVIEWED.    Family History   Problem Relation Age of Onset   • Diabetes Mother    • Heart disease Mother    • Kidney disease Mother    • Arthritis Father    • COPD Father    • Heart disease Father    • Hypertension Father    • Cancer Maternal Grandmother    • Thyroid cancer Other      Social History     Tobacco Use   • Smoking status: Every Day     Packs/day: 1.00     Types: Cigarettes   • Smokeless tobacco: Never   • Tobacco comments:     40 years   Vaping Use   • Vaping Use: Never used   Substance Use Topics   • Alcohol use: Not Currently   • Drug use: Never  "    Social History     Social History Narrative   • Not on file     FAMILY AND SOCIAL HISTORY REVIEWED.    Review of Systems  ALSO REFER TO SCANNED ROS SHEET FROM SAME DATE.    /60 (BP Location: Left arm, Patient Position: Sitting, Cuff Size: Adult)   Pulse 114   Temp 98.2 °F (36.8 °C) (Infrared)   Resp 18   Ht 162.6 cm (64\")   Wt 119 kg (261 lb 6.4 oz)   SpO2 95%   BMI 44.87 kg/m²   Physical Exam  Vitals and nursing note reviewed.   Constitutional:       General: She is not in acute distress.     Appearance: She is well-developed. She is not diaphoretic.   HENT:      Head: Normocephalic and atraumatic.   Neck:      Thyroid: No thyromegaly.   Cardiovascular:      Rate and Rhythm: Normal rate and regular rhythm.      Heart sounds: Normal heart sounds. No murmur heard.  Pulmonary:      Effort: Pulmonary effort is normal.      Breath sounds: No stridor.      Comments: Decreased breath sounds bilaterally with late expiratory wheezes  Lymphadenopathy:      Cervical: No cervical adenopathy.      Upper Body:      Right upper body: No supraclavicular or epitrochlear adenopathy.      Left upper body: No supraclavicular or epitrochlear adenopathy.   Skin:     General: Skin is warm and dry.   Neurological:      Mental Status: She is alert and oriented to person, place, and time.   Psychiatric:         Behavior: Behavior normal.         Results     CT scan of the chest from 11/2021 reviewed  Right upper lobe noncalcified pulmonary nodules less than 5 mm in size    PFTs somewhat difficult to determine with a submaximal expiratory effort.  Severe airway obstruction cannot be excluded.  Restrictive defect and a reduced diffusion capacity    There is no immunization history on file for this patient.  Problem List       ICD-10-CM ICD-9-CM   1. Chronic respiratory failure with hypoxia (HCC)  J96.11 518.83     799.02   2. Mucopurulent chronic bronchitis (HCC)  J41.1 491.1   3. Obesity, Class III, BMI 40-49.9 (morbid " obesity) (Formerly [facility])  E66.01 278.01   4. Two 4 mm right upper lobe noncalcified nodules 11/2021  R91.1 793.11   5. Tobacco abuse  Z72.0 305.1       Discussion     The patient appears to have another acute exacerbation of bronchitis  I am going to put her on another course of antibiotics and steroids  She has wheezing on examination today although does not appear to be overtly toxic    We had a very anoop discussion about her smoking  I made it very clear that as long as she continues to smoke she can expect persistent and worsening symptoms and most likely will have a truncated lifespan.  There is no safe level of smoking for her and it is unlikely she will improve until after she has achieved smoking cessation    I have referred her to her local health department for their smoking cessation program  In the meantime she is asked me to prescribe nicotine patches  We discussed smoking cessation strategies    I have recommended an LDCT in November 2022  We will arrange that    She will remain on the same bronchodilator regimen  Breztri with albuterol as needed    Have encouraged continued use of supplemental oxygen 24 hours a day    Level of service justified based on 45 minutes spent in patient care on this date of service including, but not limited to: preparing to see the patient, obtaining and/or reviewing history, performing medically appropriate examination, ordering tests/medicine/procedures, independently interpreting results, documenting clinical information in EHR, and counseling/education of patient/family/caregiver (excluding time spent on other separate services such as performing procedures or test interpretation, if applicable). (Level 4 30-39 minutes; Level 5 40-54 minutes)    Charbel Denny MD  Note electronically signed    CC: Phillip Keith APRN

## 2022-10-14 DIAGNOSIS — R91.1 LUNG NODULE: Primary | ICD-10-CM

## 2022-11-07 ENCOUNTER — TRANSCRIBE ORDERS (OUTPATIENT)
Dept: PULMONOLOGY | Facility: CLINIC | Age: 62
End: 2022-11-07

## 2022-11-07 DIAGNOSIS — Z00.6 EXAMINATION FOR NORMAL COMPARISON OR CONTROL IN CLINICAL RESEARCH: Primary | ICD-10-CM

## 2022-11-08 DIAGNOSIS — R91.1 LUNG NODULE: ICD-10-CM

## 2024-02-09 ENCOUNTER — TELEPHONE (OUTPATIENT)
Dept: PULMONOLOGY | Facility: CLINIC | Age: 64
End: 2024-02-09

## 2024-02-09 NOTE — TELEPHONE ENCOUNTER
Caller: Magdaleno Curtis    Relationship: Self    Best call back number: [phone]      Who is your current provider: PULM TATA    Is your current provider offboarding? NO    Who would you like your new provider to be: REMY SAUCEDO    What are your reasons for transferring care: CLOSER    Additional notes:

## 2024-08-01 LAB — HBA1C MFR BLD: 8.1 %

## 2024-08-27 ENCOUNTER — TELEPHONE (OUTPATIENT)
Dept: PULMONOLOGY | Facility: CLINIC | Age: 64
End: 2024-08-27

## 2024-09-03 ENCOUNTER — OFFICE VISIT (OUTPATIENT)
Dept: ENDOCRINOLOGY | Facility: CLINIC | Age: 64
End: 2024-09-03
Payer: MEDICARE

## 2024-09-03 VITALS
TEMPERATURE: 98 F | BODY MASS INDEX: 42.44 KG/M2 | HEART RATE: 87 BPM | WEIGHT: 248.6 LBS | HEIGHT: 64 IN | SYSTOLIC BLOOD PRESSURE: 116 MMHG | RESPIRATION RATE: 22 BRPM | DIASTOLIC BLOOD PRESSURE: 78 MMHG | OXYGEN SATURATION: 94 %

## 2024-09-03 DIAGNOSIS — Z79.4 TYPE 2 DIABETES MELLITUS WITH HYPERGLYCEMIA, WITH LONG-TERM CURRENT USE OF INSULIN: Primary | ICD-10-CM

## 2024-09-03 DIAGNOSIS — E11.65 TYPE 2 DIABETES MELLITUS WITH HYPERGLYCEMIA, WITH LONG-TERM CURRENT USE OF INSULIN: Primary | ICD-10-CM

## 2024-09-03 LAB
EXPIRATION DATE: ABNORMAL
GLUCOSE BLDC GLUCOMTR-MCNC: 208 MG/DL (ref 70–130)
Lab: ABNORMAL

## 2024-09-03 PROCEDURE — 99214 OFFICE O/P EST MOD 30 MIN: CPT | Performed by: INTERNAL MEDICINE

## 2024-09-03 PROCEDURE — 3052F HG A1C>EQUAL 8.0%<EQUAL 9.0%: CPT | Performed by: INTERNAL MEDICINE

## 2024-09-03 PROCEDURE — 82947 ASSAY GLUCOSE BLOOD QUANT: CPT | Performed by: INTERNAL MEDICINE

## 2024-09-03 RX ORDER — FLUCONAZOLE 150 MG/1
150 TABLET ORAL DAILY
COMMUNITY
Start: 2024-07-22

## 2024-09-03 RX ORDER — METFORMIN HCL 500 MG
500 TABLET, EXTENDED RELEASE 24 HR ORAL
Qty: 90 TABLET | Refills: 1 | Status: SHIPPED | OUTPATIENT
Start: 2024-09-03

## 2024-09-03 RX ORDER — BUDESONIDE, GLYCOPYRROLATE, AND FORMOTEROL FUMARATE 160; 9; 4.8 UG/1; UG/1; UG/1
AEROSOL, METERED RESPIRATORY (INHALATION)
COMMUNITY

## 2024-09-03 RX ORDER — LOSARTAN POTASSIUM 25 MG/1
25 TABLET ORAL DAILY
COMMUNITY

## 2024-09-03 RX ORDER — BROMPHENIRAMINE MALEATE, PSEUDOEPHEDRINE HYDROCHLORIDE, AND DEXTROMETHORPHAN HYDROBROMIDE 2; 30; 10 MG/5ML; MG/5ML; MG/5ML
SYRUP ORAL
COMMUNITY
Start: 2024-07-29

## 2024-09-03 RX ORDER — METOPROLOL SUCCINATE 25 MG/1
25 TABLET, EXTENDED RELEASE ORAL DAILY
COMMUNITY

## 2024-09-03 RX ORDER — POLYETHYLENE GLYCOL 3350 17 G/17G
POWDER, FOR SOLUTION ORAL
COMMUNITY
Start: 2024-08-05

## 2024-09-03 RX ORDER — FENOFIBRATE 48 MG/1
1 TABLET, COATED ORAL DAILY
COMMUNITY
Start: 2024-08-21

## 2024-09-03 RX ORDER — TRIAMCINOLONE ACETONIDE 1 MG/G
CREAM TOPICAL
COMMUNITY
Start: 2024-08-09

## 2024-09-28 RX ORDER — INSULIN ASPART 100 [IU]/ML
INJECTION, SOLUTION INTRAVENOUS; SUBCUTANEOUS
Start: 2024-09-28

## 2024-09-28 RX ORDER — INSULIN GLARGINE 100 [IU]/ML
18 INJECTION, SOLUTION SUBCUTANEOUS NIGHTLY
Start: 2024-09-28

## 2025-03-18 ENCOUNTER — OFFICE VISIT (OUTPATIENT)
Dept: ENDOCRINOLOGY | Facility: CLINIC | Age: 65
End: 2025-03-18
Payer: MEDICARE

## 2025-03-18 VITALS
OXYGEN SATURATION: 92 % | WEIGHT: 252.4 LBS | HEIGHT: 64 IN | HEART RATE: 110 BPM | DIASTOLIC BLOOD PRESSURE: 58 MMHG | BODY MASS INDEX: 43.09 KG/M2 | SYSTOLIC BLOOD PRESSURE: 112 MMHG

## 2025-03-18 DIAGNOSIS — Z79.4 TYPE 2 DIABETES MELLITUS WITH HYPERGLYCEMIA, WITH LONG-TERM CURRENT USE OF INSULIN: Primary | ICD-10-CM

## 2025-03-18 DIAGNOSIS — E11.65 TYPE 2 DIABETES MELLITUS WITH HYPERGLYCEMIA, WITH LONG-TERM CURRENT USE OF INSULIN: Primary | ICD-10-CM

## 2025-03-18 LAB
EXPIRATION DATE: ABNORMAL
EXPIRATION DATE: ABNORMAL
GLUCOSE BLDC GLUCOMTR-MCNC: 412 MG/DL (ref 70–130)
HBA1C MFR BLD: 9.9 % (ref 4.5–5.7)
Lab: ABNORMAL
Lab: ABNORMAL

## 2025-03-18 PROCEDURE — 99214 OFFICE O/P EST MOD 30 MIN: CPT | Performed by: INTERNAL MEDICINE

## 2025-03-18 PROCEDURE — 3046F HEMOGLOBIN A1C LEVEL >9.0%: CPT | Performed by: INTERNAL MEDICINE

## 2025-03-18 PROCEDURE — 83036 HEMOGLOBIN GLYCOSYLATED A1C: CPT | Performed by: INTERNAL MEDICINE

## 2025-03-18 PROCEDURE — 82947 ASSAY GLUCOSE BLOOD QUANT: CPT | Performed by: INTERNAL MEDICINE

## 2025-03-18 RX ORDER — AZITHROMYCIN 250 MG/1
250 TABLET, FILM COATED ORAL 3 TIMES WEEKLY
COMMUNITY

## 2025-03-18 RX ORDER — ONDANSETRON 8 MG/1
8 TABLET, FILM COATED ORAL DAILY
COMMUNITY

## 2025-03-18 RX ORDER — HYDROXYZINE PAMOATE 25 MG/1
1 CAPSULE ORAL EVERY 8 HOURS PRN
COMMUNITY
End: 2025-04-02

## 2025-03-18 RX ORDER — NITROGLYCERIN 0.4 MG/1
TABLET SUBLINGUAL
COMMUNITY
Start: 2025-03-06

## 2025-03-18 RX ORDER — CLINDAMYCIN PHOSPHATE 10 MG/G
GEL TOPICAL
COMMUNITY

## 2025-03-18 RX ORDER — PREDNISONE 10 MG/1
10 TABLET ORAL DAILY
COMMUNITY

## 2025-03-18 RX ORDER — ACYCLOVIR 400 MG/1
TABLET ORAL
COMMUNITY
Start: 2025-01-08

## 2025-03-18 RX ORDER — LOSARTAN POTASSIUM AND HYDROCHLOROTHIAZIDE 12.5; 5 MG/1; MG/1
1 TABLET ORAL DAILY
COMMUNITY
Start: 2025-02-24

## 2025-03-18 RX ORDER — MUPIROCIN CALCIUM 20 MG/G
CREAM TOPICAL
COMMUNITY

## 2025-03-18 RX ORDER — INSULIN HUMAN 100 [IU]/ML
INJECTION, SUSPENSION SUBCUTANEOUS
Qty: 15 ML | Refills: 11 | Status: SHIPPED | OUTPATIENT
Start: 2025-03-18

## 2025-03-18 NOTE — PROGRESS NOTES
Chief Complaint   Patient presents with    Diabetes     Follow-up       HPI:   Magdaleno Curtis is a 64 y.o.female who returns to endocrine clinic for follow-up evaluation and management of her Type 2 diabetes. Last visit 09/03/2024. Her history is as follows:    Pt is accompanied by her daughter today.    Interim Events:   - pt had to cancel her 01/21/2025 appt due to inclement weather  - had been using her dexcom but sensor repeatedly fell off her arm  - is on prednisone 10 mg daily for her COPD  - Pt did not tolerate the Rybelsus samples given to her last visit  - Pt did not tolerate low dose metformin  mg daily (caused diarrhea)    1) Type 2 DM with no known associated complications at this time:   - Diagnosed with diabetes in 2020.   - Was initially prescribed plain metformin by a former PCP but pt did not tolerate. She developed abdominal pain.  - She was then prescribed glipizide qAM + glyburide qhs by former PCP at that time. Pt stated she was having symptoms of abdominal pain so she stopped the glipizide but was not sure if the medication was the cause of her symptoms. On further review, Pt had chronic nausea, abdominal pain off of medication.    Initial Endocrine Visit: (08/10/2021)   - pt's Hgb A1C% was 7.8 in 08/2021  - Discussed treatment options. GLP-1 agonists not ideal as she has chronic Abd pain/ nausea.  After further discussion with the patient, she has chronic abdominal pain and nausea that occurred even off of glipizide.  Discussed that the sulfonylureas very unlikely the cause of any of her GI symptoms.  Patient was agreeable to trying low-dose glipizide again as well as samples of Januvia.   - pt missed her 12/2021 appointment. Did not schedule f/u until 2022. Pt did not take the Januvia samples I gave her in 08/2021.  - New PCP started Januvia in 2022. Hemoglobin A1c in March 2022 was 9.6%.   - (2023)   - Her PCP prescribed Ozempic at doses of 0.25 mg, 0.5 mg weekly which caused severe  "GERD, nausea, and constipation. Pt was prescribed Carafate and Protonix for these symptoms which did not help    ()   - Pt did not tolerate the Rybelsus samples given to her last visit  - Pt did not tolerate low dose metformin  mg daily (caused diarrhea)      Current DM Medications:  - Lantus 40 units qAM  - Novolo units TID AC meals + CF of 1 unit: 50 > 150 (is taking 12 - 15 units most meals)    Dexcom Review: could not review  Glucometer: AM BG's 200's, midday 200-300's, bedtime 200's - 300's      DM Health Maintenance:  Ophtho: due  Podiatry: no recent visit  Monofilament / Foot exam: due  Lipids: (2022) Tchol 194, , HDL 38,  - is not on statin  Urine Microalb/Cr ratio: DUE  TSH: (2022) 3.030  CMP: (2022) Cr 0.82, eGFR 81, LFTs WNL  CBC: (2022) Hgb 14.2, WNL  Vit B12: (2022) 637    Other history:   - COPD on NC O2    Review of Systems   Constitutional:  Positive for fatigue.   HENT: Negative.     Eyes: Negative.    Respiratory:  Positive for shortness of breath and wheezing.    Cardiovascular: Negative.    Gastrointestinal: Negative.    Endocrine:        Hyperglycemia   Genitourinary: Negative.    Musculoskeletal:  Positive for arthralgias, back pain and myalgias.   Skin: Negative.    Allergic/Immunologic: Negative.    Neurological: Negative.    Hematological:  Bruises/bleeds easily.   Psychiatric/Behavioral:  Positive for sleep disturbance.         H/o anxiety     The following portions of the patient's history were reviewed and updated as appropriate: allergies, current medications, past family history, past medical history, past social history, past surgical history and problem list.    /58 (BP Location: Left arm, Patient Position: Sitting)   Pulse 110   Ht 162.6 cm (64.02\")   Wt 114 kg (252 lb 6.4 oz)   SpO2 92%   BMI 43.30 kg/m²   Physical Exam  Vitals reviewed.   Constitutional:       General: She is not in acute distress.     Appearance: Normal " appearance. She is well-developed. She is not diaphoretic.   HENT:      Head: Normocephalic.   Eyes:      Conjunctiva/sclera: Conjunctivae normal.      Pupils: Pupils are equal, round, and reactive to light.   Neck:      Thyroid: No thyromegaly.      Trachea: No tracheal deviation.      Comments: No palpable thyroid nodules    Cardiovascular:      Rate and Rhythm: Normal rate and regular rhythm.      Heart sounds: Normal heart sounds. No murmur heard.  Pulmonary:      Effort: Pulmonary effort is normal. No respiratory distress.      Breath sounds: Examination of the right-lower field reveals decreased breath sounds. Examination of the left-lower field reveals decreased breath sounds. Decreased breath sounds present.   Abdominal:      General: Bowel sounds are normal.      Palpations: Abdomen is soft. There is no mass.      Tenderness: There is no abdominal tenderness.   Lymphadenopathy:      Cervical: No cervical adenopathy.   Skin:     General: Skin is warm and dry.      Findings: No erythema.   Neurological:      Mental Status: She is alert and oriented to person, place, and time.      Cranial Nerves: No cranial nerve deficit.   Psychiatric:         Behavior: Behavior normal.       LABS/IMAGING: outside records reviewed and summarized in HPI  Office Visit on 03/18/2025   Component Date Value Ref Range Status    Glucose 03/18/2025 412 (A)  70 - 130 mg/dL Final    Lot Number 03/18/2025 [id number]   Final    Expiration Date 03/18/2025 08/30/2025   Final    Hemoglobin A1C 03/18/2025 9.9 (A)  4.5 - 5.7 % Final    Lot Number 03/18/2025 [id number]   Final    Expiration Date 03/18/2025 11/06/2026   Final       ASSESSMENT/PLAN:  1) Type 2 DM with no known associated complications at this time: uncontrolled with hyperglycemia, A1C% today is 9.9%.     - In 2023, Her PCP prescribed Ozempic at doses of 0.25 mg, 0.5 mg weekly which caused severe GERD, nausea, and constipation. Pt was prescribed Carafate and Protonix for these  symptoms which did not help.   - In , pt tried Rybelsus 3 mg and metformin  mg daily, she did not tolerate either medication due to GI symptoms    - Start Mounjaro 2.5 mg weekly. Reviewed potential GI side effects.   - Discontinue Lantus and change to NPH due to chronic steroid use: 35 units qAM and 10 units qHS  - Increase Novolog to: 12 units TID AC meals + CF of 1 unit: 50 > 150  Written instructions reviewed with pt and her daughter  - Pt to call with BG readings in 2-3 weeks      - Pt not on a statin. Will discuss again at her next visit    RTC 3-4 months.    Electronically Signed: Tiffany Bennett MD      ADDENDUM: pt's daughter brought in her glucometer for review on 2025. I had also spoken with patient a few days earlier and d/c'd the nightly dose of NPH due to overnight hypoglycemia.   Pt to continue:   Mounjaro 2.5 mg weekly. Reviewed potential GI side effects.   NPH 35 units qAM   Novolo units TID AC meals + CF of 1 unit: 50 > 150    Electronically Signed: Tiffany Bennett MD

## 2025-03-21 ENCOUNTER — TELEPHONE (OUTPATIENT)
Dept: ENDOCRINOLOGY | Facility: CLINIC | Age: 65
End: 2025-03-21
Payer: COMMERCIAL

## 2025-03-21 NOTE — TELEPHONE ENCOUNTER
Patient called having confusion on her insulin dosing. Per Dr. Bennett's instructions gave to pt during her last ofv, she is to give 12 units plus CF 1u: 50>150. Pt states Dr. Bennett wrote them down for her but I cannot see where she put that in her note. She is giving her Humulin N appropriately. She has been giving the 15 units today for breakfast and lunch, and the MDD is 30units. Her blood sugar was 289 this AM and she gave 15 units, her blood sugar at lunch was 284 and she gave 15 units as well. She is worried about going over the MDD. Advised pt I would let Dr. Bennett know to clarify due to no documentation in ofv note. She voiced understanding.

## 2025-03-21 NOTE — TELEPHONE ENCOUNTER
Spoke to patient and reviewed the insulin direction sheet I gave her during her during her visit. Pt is taking her insulin as directed. Her confusion was based on another provider outside of [facility] who told her years ago that she could take > 30 units of novolog per day. I informed the patient that is medically incorrect information. Pt understood her current instructions and her daughter will bring pt's Dexcom reader device to clinic to download over the next 1-2 weeks.   Electronically Signed: Tiffany Bennett MD

## 2025-04-02 ENCOUNTER — OFFICE VISIT (OUTPATIENT)
Dept: PULMONOLOGY | Facility: CLINIC | Age: 65
End: 2025-04-02
Payer: MEDICARE

## 2025-04-02 VITALS
DIASTOLIC BLOOD PRESSURE: 84 MMHG | SYSTOLIC BLOOD PRESSURE: 138 MMHG | WEIGHT: 256 LBS | OXYGEN SATURATION: 88 % | HEIGHT: 64 IN | HEART RATE: 108 BPM | BODY MASS INDEX: 43.71 KG/M2

## 2025-04-02 DIAGNOSIS — I27.20 PULMONARY HYPERTENSION: ICD-10-CM

## 2025-04-02 DIAGNOSIS — Z12.2 ENCOUNTER FOR SCREENING FOR LUNG CANCER: ICD-10-CM

## 2025-04-02 DIAGNOSIS — R06.02 SHORTNESS OF BREATH: Primary | ICD-10-CM

## 2025-04-02 DIAGNOSIS — J44.9 CHRONIC OBSTRUCTIVE PULMONARY DISEASE, UNSPECIFIED COPD TYPE: ICD-10-CM

## 2025-04-02 DIAGNOSIS — J47.9 BRONCHIECTASIS WITHOUT COMPLICATION: ICD-10-CM

## 2025-04-02 DIAGNOSIS — G47.33 OSA (OBSTRUCTIVE SLEEP APNEA): ICD-10-CM

## 2025-04-02 DIAGNOSIS — J96.11 CHRONIC RESPIRATORY FAILURE WITH HYPOXIA: ICD-10-CM

## 2025-04-02 DIAGNOSIS — E66.01 MORBID OBESITY WITH BMI OF 40.0-44.9, ADULT: ICD-10-CM

## 2025-04-02 DIAGNOSIS — F17.210 CIGARETTE NICOTINE DEPENDENCE WITHOUT COMPLICATION: ICD-10-CM

## 2025-04-02 RX ORDER — PREDNISONE 20 MG/1
TABLET ORAL
COMMUNITY

## 2025-04-02 RX ORDER — TIRZEPATIDE 2.5 MG/.5ML
INJECTION, SOLUTION SUBCUTANEOUS
COMMUNITY

## 2025-04-02 RX ORDER — DESVENLAFAXINE 25 MG/1
1 TABLET, EXTENDED RELEASE ORAL DAILY
COMMUNITY

## 2025-04-02 RX ORDER — BLOOD-GLUCOSE METER
EACH MISCELLANEOUS
COMMUNITY
Start: 2025-03-20

## 2025-04-02 NOTE — PROGRESS NOTES
Follow Up Office Visit      Patient Name: Magdaleno Curtis    Chief Complaint:    Chief Complaint   Patient presents with    Breathing Problem       History of Present Illness: Magdaleno Curtis is a 64 y.o. female who is here today for follow up of COPD. Chart review notes that her history also includes pulmonary hypertension due to COPD, chronic respiratory failure dependent on supplemental O2, sleep apnea intolerant to PAP use, bronchiectasis, and small lung nodules. She formerly followed with me at [facility] and then Dr. Gentile there who told her that she had asthma, thereafter saw [facility] in 2022 and more recently had been following with [facility], and has also seen Dr. Mckinley who she says diagnosed her with bronchiectasis and started her on a percussion vest.    She continues on use of Breztri; has had some issues with thrush previously. Has historically been intolerant to use of powder inhalers.  She did not tolerate use of Daliresp in the past and notes that actually worsened her breathing.  She requires albuterol or DuoNebs 3-4 times per day at baseline.  She has been placed on 3 times per week azithromycin.  She notes daily prednisone use over the past 5 months as she notes that her symptoms are only acceptably controlled when she is on steroids and antibiotics.  Currently on a higher dose of prednisone though will resume her daily 10 mg dose thereafter. She continues to smoke.    Duration: Breathing difficulties since ~2017  Severity: Severe dyspnea  Timing: Daily  Context: Minimal exertion  Associated Symptoms: Chronic cough productive of thick dark yellow or gray sputum, wheezing, no fevers, no hemoptysis, no unintended weight loss  Modifying Factors: Worse with exertion, improves with rest/inhaler/steroids.  Supplemental Oxygen: 2L [place], DME is [facility], prefers to use portable tanks rather that a POC.  Cardiac History: Tachycardia, following with Dr. Margarita Ascencio Steroids:  Chronic use; 10 mg prednisone daily x5 months and is currently on a higher dose for exacerbation treatment    Subjective      Review of Systems:  Review of Systems   Constitutional:  Negative for fever and unexpected weight change.   Respiratory:  Positive for cough, shortness of breath and wheezing.    Cardiovascular:  Negative for chest pain and leg swelling.        Past Medical History:   Past Medical History:   Diagnosis Date    Arthritis     Back problem     Bladder infection     Bronchiolitis     Emphysema lung     High blood pressure     Pneumonia     Stroke     Type 2 diabetes mellitus        Past Surgical History:   Past Surgical History:   Procedure Laterality Date    HYSTERECTOMY         Family History:   Family History   Problem Relation Age of Onset    Diabetes Mother     Heart disease Mother     Kidney disease Mother     Arthritis Father     COPD Father     Heart disease Father     Hypertension Father     Cancer Maternal Grandmother     Thyroid cancer Other        Social History:   Social History     Socioeconomic History    Marital status:    Tobacco Use    Smoking status: Every Day     Current packs/day: 0.25     Average packs/day: 0.3 packs/day for 40.0 years (10.0 ttl pk-yrs)     Types: Cigarettes     Passive exposure: Current    Smokeless tobacco: Never    Tobacco comments:     40 years   Vaping Use    Vaping status: Never Used   Substance and Sexual Activity    Alcohol use: Not Currently    Drug use: Never    Sexual activity: Defer       Current Medications:     Current Outpatient Medications:     albuterol (PROVENTIL) (2.5 MG/3ML) 0.083% nebulizer solution, Take 2.5 mg by nebulization Every 4 (Four) Hours As Needed for Wheezing., Disp: , Rfl:     aspirin 81 MG chewable tablet, Chew 1 tablet Daily., Disp: , Rfl:     azithromycin (ZITHROMAX) 250 MG tablet, Take 1 tablet by mouth 3 (Three) Times a Week., Disp: , Rfl:     Blood Glucose Monitoring Suppl (Accu-Chek Guide) w/Device kit, USE AS  DIRECTED TO TEST SUGAR, Disp: , Rfl:     Breztri Aerosphere 160-9-4.8 MCG/ACT aerosol inhaler, Inhale 2 puffs twice a day by inhalation route., Disp: , Rfl:     brompheniramine-pseudoephedrine-DM 30-2-10 MG/5ML syrup, TAKE 5ML BY MOUTH EVERY 6 HOURS, Disp: , Rfl:     clindamycin 1 % gel, , Disp: , Rfl:     clonazePAM (KlonoPIN) 0.5 MG tablet, TAKE ONE TABELT BY MOUTH TWICE DAILY AT BEDTIME, Disp: , Rfl:     Continuous Glucose Sensor (Dexcom G7 Sensor) misc, CHANGE EVERY 10 DAYS, Disp: , Rfl:     docusate sodium (COLACE) 100 MG capsule, Take 1 capsule by mouth 2 (Two) Times a Day., Disp: , Rfl:     fenofibrate (TRICOR) 48 MG tablet, Take 1 tablet by mouth Daily., Disp: , Rfl:     fluticasone (FLONASE) 50 MCG/ACT nasal spray, Administer 1 spray into the nostril(s) as directed by provider As Needed., Disp: , Rfl:     HumuLIN N KwikPen 100 UNIT/ML injection, Take 35 units in AM and 15 units nightly. Please D/C prior Rx for Lantus, Disp: 15 mL, Rfl: 11    HYDROcodone-acetaminophen (NORCO)  MG per tablet, , Disp: , Rfl:     Hydrocortisone 1 % gel, APPLY TOPICALLY TO THE AFFECTED AREA TWICE DAILY FOR 7 DAYS, Disp: , Rfl:     hydrOXYzine (ATARAX) 25 MG tablet, Take 1 tablet by mouth 3 (Three) Times a Day As Needed for Itching., Disp: , Rfl:     insulin aspart (NovoLOG FlexPen) 100 UNIT/ML solution pen-injector sc pen, 5 units TID ac meals plus extra as directed up to 30 units per day, Disp: , Rfl:     Insulin Pen Needle (Pen Needles) 32G X 4 MM misc, 1 each 3 (Three) Times a Day., Disp: 100 each, Rfl: 3    ipratropium-albuterol (DUO-NEB) 0.5-2.5 mg/3 ml nebulizer, , Disp: , Rfl:     Lancets (OneTouch Delica Plus Ihfrpo66E) misc, Inject 1 each under the skin into the appropriate area as directed 3 (Three) Times a Day., Disp: 100 each, Rfl: 5    loratadine (CLARITIN) 10 MG tablet, Take 1 tablet by mouth Daily., Disp: , Rfl:     losartan-hydrochlorothiazide (HYZAAR) 50-12.5 MG per tablet, Take 1 tablet by mouth Daily.,  "Disp: , Rfl:     metoprolol succinate XL (TOPROL-XL) 25 MG 24 hr tablet, Take 1 tablet by mouth Daily., Disp: , Rfl:     mupirocin (BACTROBAN) 2 % cream, , Disp: , Rfl:     nitroglycerin (NITROSTAT) 0.4 MG SL tablet, , Disp: , Rfl:     nystatin (MYCOSTATIN) 100,000 unit/mL suspension, , Disp: , Rfl:     O2 (OXYGEN), oxygen  2LT Nasal [place], Disp: , Rfl:     Omega-3 Fatty Acids (fish oil) 1000 MG capsule capsule, Take  by mouth Daily With Breakfast., Disp: , Rfl:     ondansetron (ZOFRAN) 8 MG tablet, Take 1 tablet by mouth Daily., Disp: , Rfl:     OneTouch Ultra test strip, , Disp: , Rfl:     polyethylene glycol (MIRALAX) 17 GM/SCOOP powder, MIX 1 SCOOP WITH 8OZ OF FLUID TWICE DAILY, Disp: , Rfl:     sertraline (ZOLOFT) 100 MG tablet, 0.5 tablets., Disp: , Rfl:     SM Mucus Relief 600 MG 12 hr tablet, Take 2 tablets by mouth 2 (Two) Times a Day As Needed., Disp: , Rfl:     sucralfate (CARAFATE) 1 GM/10ML suspension, , Disp: , Rfl:     triamcinolone (KENALOG) 0.1 % cream, APPLY TOPICALLY TO THE AFFECTED AREA 2 TIMES A WEEK FOR 7 DAYS, Disp: , Rfl:     cholecalciferol (VITAMIN D3) 10 MCG (400 UNIT) tablet, , Disp: , Rfl:     Desvenlafaxine Succinate ER 25 MG tablet sustained-release 24 hour, Take 1 tablet by mouth Daily., Disp: , Rfl:     predniSONE (DELTASONE) 10 MG tablet, Take 1 tablet by mouth Daily. (Patient not taking: Reported on 4/2/2025), Disp: , Rfl:     predniSONE (DELTASONE) 20 MG tablet, TAKE 1 TABLET BY MOUTH DAILY WITH FOOD OR MILK FOR 5 DAYS, Disp: , Rfl:     Tirzepatide (Mounjaro) 2.5 MG/0.5ML solution auto-injector, , Disp: , Rfl:      Allergies:   Allergies   Allergen Reactions    Codeine Nausea And Vomiting     Objective     Physical Exam:  Vital Signs:   Vitals:    04/02/25 1327   BP: 138/84   Pulse: 108   SpO2: (!) 88%  Comment: RA- 92%@2LPM   Weight: 116 kg (256 lb)   Height: 162.6 cm (64\")     Body mass index is 43.94 kg/m².    Physical Exam  Vitals reviewed.   Constitutional:       General: " She is not in acute distress.     Appearance: She is obese. She is not toxic-appearing.   HENT:      Head: Normocephalic and atraumatic.      Mouth/Throat:      Mouth: Mucous membranes are moist.   Eyes:      Extraocular Movements: Extraocular movements intact.      Conjunctiva/sclera: Conjunctivae normal.   Cardiovascular:      Rate and Rhythm: Tachycardia present.      Heart sounds: Normal heart sounds.   Pulmonary:      Effort: Pulmonary effort is normal.      Breath sounds: Normal breath sounds.   Abdominal:      General: There is no distension.      Palpations: Abdomen is soft.   Musculoskeletal:      Cervical back: Neck supple.      Comments: Deconditioned, in a wheelchair   Skin:     General: Skin is warm and dry.      Findings: No rash.   Neurological:      General: No focal deficit present.      Mental Status: She is alert and oriented to person, place, and time.   Psychiatric:         Mood and Affect: Mood normal.         Behavior: Behavior normal.       Results Review:   January 2025 echocardiogram showed:  Normal sized left ventricle.   Mild left ventricular hypertrophy.   Normal left ventricular systolic function.   Visually estimated ejection fraction 55% +/- 5%.   Pseudonormalization of mitral inflow Doppler pattern.   Tissue Doppler Index (E/E'') c/w elevated LV filling pressures.   No left ventricular masses or thrombi.   Technically difficult study due to patient body habitus and COPD.   RVSP 45.36 mmHg.    June 2024 LDCT chest showed benign-appearing right lung nodules.    2022 PFT showed moderate restriction, no bronchodilator administered, FEV1 51%, FEV1/FVC ratio 74, no impairment in diffusion capacity when adjusted for alveolar volume.    March 2022 absolute eosinophils 200.    March 2025 Hgb A1C 9.9    Assessment / Plan      Assessment/Plan:   Diagnoses and all orders for this visit:    1. Shortness of breath (Primary)  -     Complete PFT - Pre & Post Bronchodilator; Future  Appears to be  multifactorial.  Continues to follow with cardiology.    2. Chronic obstructive pulmonary disease, unspecified COPD type  -     Complete PFT - Pre & Post Bronchodilator; Future  Schedule repeat PFTs.  Continue current inhaled regimen. We discussed the risk and benefits of inhaled corticosteroids. Patient instructed to take them on a regular basis as prescribed. Patient instructed to rinse their mouth out after each use.  Given the severity of symptoms, frequent exacerbations, and chronic p.o. steroid use in the setting of uncontrolled diabetes, we had a long discussion about potential use of biologic injections which would be expected to be beneficial for her and may decrease her need for systemic steroid use.  She is reluctant to consider use of a biologic but we will plan to discuss further at next clinic visit once repeat testing is obtained and reviewed.  We discussed the risks associated with chronic prednisone use.  Will request any updated CBC with differential results from her PCP though it is unlikely that we will be able to obtain a more recent and accurate eosinophil count given her dependence on steroids. She should maintain as much physical activity as she can safely tolerate.    3. Cigarette nicotine dependence without complication  -      CT Chest Low Dose Cancer Screening WO; Future  Complete cessation is advised. Patient's symptoms are not expected to be as controlled as possible while still smoking and progression of lung disease will occur more rapidly with ongoing cigarette use. The patient was educated on the risk of fire/explosion and associated harm to self or others if open flame, including that from cigarettes if near a supplemental oxygen source. Patient voiced understanding.     4. Encounter for screening for lung cancer  -      CT Chest Low Dose Cancer Screening WO; Future  Patient with greater than 30-pack-year smoking history, and therefore low-dose lung cancer screening is recommended.   Shared decision making counseling including risks and benefits of low-dose lung cancer screening, follow-up diagnostic testing that may be indicated and how comorbid conditions would affect diagnosis/treatment, false positive rates, and total radiation exposure.  Patient wishes to undergo screening, seen as they would be willing to seek diagnosis and treatment if necessitated by results.    5. VICKI (obstructive sleep apnea)  Declines repeat sleep study or PAP use.  Has been intolerant to PAP use previously. The patient was counseled on the risks of untreated sleep apnea and voiced understanding.     6. Pulmonary hypertension  Recent echocardiogram report reviewed.  Multifactorial, likely WHO group 2/3.  Continue treatment of contributing conditions as much as possible.  Unfortunately does not tolerate treatment of sleep apnea.    7. Bronchiectasis without complication  Does not appear to be exacerbating currently.  Continue consistent use of percussion vest.    8. Chronic respiratory failure with hypoxia  Continue on supplemental O2.    9. Morbid obesity with BMI of 40.0-44.9, adult  A significant amount of weight loss is necessary to aid in symptom control related to comorbid conditions.  Has recently started Mounjaro.      I spent 50 minutes caring for Magdaleno on this date of service. This time includes time spent by me in the following activities: preparing for the visit, reviewing tests, obtaining and/or reviewing a separately obtained history, performing a medically appropriate examination and/or evaluation, counseling and educating the patient/family/caregiver, ordering medications, tests, or procedures, referring and communicating with other health care professionals, documenting information in the medical record, independently interpreting results and communicating that information with the patient/family/caregiver, and care coordination.       Follow Up:   3 months  The patient was counseled on diagnostic  results, risks and benefits of treatment options, risk factor modifications and the importance of treatment compliance. The patient was advised to contact the clinic with concerns or worsening symptoms.     NIYAH Silva   [facility] [place]     This document has been electronically signed by NIYAH Silva  April 2, 2025

## 2025-04-13 PROBLEM — Z79.4 TYPE 2 DIABETES MELLITUS WITH HYPERGLYCEMIA, WITH LONG-TERM CURRENT USE OF INSULIN: Status: ACTIVE | Noted: 2021-09-12

## 2025-04-13 RX ORDER — TIRZEPATIDE 2.5 MG/.5ML
2.5 INJECTION, SOLUTION SUBCUTANEOUS WEEKLY
Qty: 2 ML | Refills: 5 | Status: SHIPPED | OUTPATIENT
Start: 2025-04-13

## 2025-05-01 ENCOUNTER — PRIOR AUTHORIZATION (OUTPATIENT)
Dept: ENDOCRINOLOGY | Facility: CLINIC | Age: 65
End: 2025-05-01
Payer: MEDICARE

## 2025-05-01 NOTE — TELEPHONE ENCOUNTER
Magdaleno Curtis ([place]ah[place]: [id number])  Rx #: [id number]  Mounjaro 2.5MG/0.5ML auto-injectors  Form  [employer] Electronic PA Form  Created  3 hours ago  Sent to Plan  17 minutes ago  Plan Response  17 minutes ago  Submit Clinical Questions  3 minutes ago  Determination  Favorable  3 minutes ago  Message from Plan  PA Case: [id number], Status: Approved, Coverage Starts on: 1/1/2025 12:00:00 AM, Coverage Ends on: 12/31/2025 12:00:00 AM. Questions? Contact [phone].. Authorization Expiration Date: December 31, 2025.

## 2025-05-15 ENCOUNTER — TELEPHONE (OUTPATIENT)
Dept: ENDOCRINOLOGY | Facility: CLINIC | Age: 65
End: 2025-05-15
Payer: MEDICARE

## 2025-05-16 ENCOUNTER — TELEPHONE (OUTPATIENT)
Dept: PULMONOLOGY | Facility: CLINIC | Age: 65
End: 2025-05-16

## 2025-05-16 DIAGNOSIS — Z79.4 TYPE 2 DIABETES MELLITUS WITH HYPERGLYCEMIA, WITH LONG-TERM CURRENT USE OF INSULIN: Primary | ICD-10-CM

## 2025-05-16 DIAGNOSIS — E11.65 TYPE 2 DIABETES MELLITUS WITH HYPERGLYCEMIA, WITH LONG-TERM CURRENT USE OF INSULIN: Primary | ICD-10-CM

## 2025-05-16 RX ORDER — TIRZEPATIDE 5 MG/.5ML
5 INJECTION, SOLUTION SUBCUTANEOUS WEEKLY
Qty: 2 ML | Refills: 5 | Status: SHIPPED | OUTPATIENT
Start: 2025-05-16

## 2025-05-16 NOTE — TELEPHONE ENCOUNTER
Caller: [facility]    Relationship: PCP    Best call back number: [phone]    What form or medical record are you requesting: LAST OFFICE NOTES    Who is requesting this form or medical record from you: PCP    FAX;  [phone]  Timeframe paperwork needed:ASAP

## 2025-05-16 NOTE — TELEPHONE ENCOUNTER
Spoke with patient.  Reviewed with her again that the 2.5 mg dose of Mounjaro is the starter dose and not the treatment dose.  She is overall tolerating the Mounjaro better than Ozempic.  I have sent a new prescription for 5 mg Mounjaro weekly.   Reviewed with patient that a lot of her hyperglycemia recently has been due to prednisone prescribed to her for URI.   Electronically Signed: Tiffany Bennett MD

## 2025-07-02 ENCOUNTER — TELEPHONE (OUTPATIENT)
Dept: ENDOCRINOLOGY | Facility: CLINIC | Age: 65
End: 2025-07-02
Payer: MEDICARE

## 2025-07-02 NOTE — TELEPHONE ENCOUNTER
PT CALLED STATING SHE STOPPED TAKING PREDNISONE EVERY DAY.     SHE STOPPED MOUNJARO.     PT REQUESTED A CALL BACK TO CONSULT.

## 2025-07-03 ENCOUNTER — TELEPHONE (OUTPATIENT)
Dept: PULMONOLOGY | Facility: CLINIC | Age: 65
End: 2025-07-03
Payer: MEDICARE

## 2025-07-03 NOTE — TELEPHONE ENCOUNTER
Called the pt and she stated another phys told her to stop the prednisone to see how did without it. She stated she is doing good.    She stopped the Mounjaro due to severe stomach issues.    Metoprolol was stopped due to swelling feet and legs.

## 2025-07-03 NOTE — TELEPHONE ENCOUNTER
ATTEMPTED TO CALL THE PT FOLLOWING UP ON SCHEDULING PFT. GOT RECORDING PT HAS NOT SET HER VOICEMAIL.

## 2025-07-08 ENCOUNTER — TELEPHONE (OUTPATIENT)
Dept: PULMONOLOGY | Facility: CLINIC | Age: 65
End: 2025-07-08
Payer: MEDICARE

## 2025-07-10 ENCOUNTER — TELEPHONE (OUTPATIENT)
Dept: PULMONOLOGY | Facility: CLINIC | Age: 65
End: 2025-07-10
Payer: MEDICARE

## 2025-07-10 NOTE — TELEPHONE ENCOUNTER
Letter was mailed on 6/10 to patient regarding CT. Patient has not contacted the office and its been over 30 days. Canceling CT order.

## 2025-07-15 ENCOUNTER — TELEPHONE (OUTPATIENT)
Dept: PULMONOLOGY | Facility: CLINIC | Age: 65
End: 2025-07-15
Payer: MEDICARE

## 2025-07-18 NOTE — TELEPHONE ENCOUNTER
Spoke with patient. She is currently on prednisone 10 mg daily for COPD exacerbation and pneumonia.   She has been taking NPH 20 mg qAM. Will increase NPH 30 units qAM   Continue Novolo units TID AC meals + CF of 1 unit: 50 > 150  (Mounjaro 5 mg has been d/c'd due to side effects).  Electronically Signed: Tiffany Bennett MD

## 2025-07-23 ENCOUNTER — TELEPHONE (OUTPATIENT)
Dept: ENDOCRINOLOGY | Facility: CLINIC | Age: 65
End: 2025-07-23
Payer: MEDICARE

## 2025-07-23 NOTE — TELEPHONE ENCOUNTER
PATIENT HAD BEEN IN THE HOSPITAL FOR REAL BAD LUNG ISSUES, SHE IS TAKING STEROID PREDNISONE AND NEEDS TO DISCUSS HER INSULIN DOSAGE DUE TO THE STEROID. PHONE NUMBER [phone]

## 2025-07-23 NOTE — TELEPHONE ENCOUNTER
Pt returned call and stated she was in the hospital from 7/18/25 to 7/22/25. She was put on IV steroids while in the hospital. When she was released she was gave pred 20 mg.    She is not sharing her Dexcom. She said at 7 am B/S was 152. Her numbers are running good so far.    She is only taking Novolog 15 u with the sliding scale.    She is wanting to know what she should do in case numbers get to high.    Please advise.

## 2025-07-24 DIAGNOSIS — Z79.4 TYPE 2 DIABETES MELLITUS WITH HYPERGLYCEMIA, WITH LONG-TERM CURRENT USE OF INSULIN: ICD-10-CM

## 2025-07-24 DIAGNOSIS — E11.65 TYPE 2 DIABETES MELLITUS WITH HYPERGLYCEMIA, WITH LONG-TERM CURRENT USE OF INSULIN: ICD-10-CM

## 2025-07-24 RX ORDER — INSULIN ASPART 100 [IU]/ML
INJECTION, SOLUTION INTRAVENOUS; SUBCUTANEOUS
Qty: 45 ML | Refills: 3 | Status: SHIPPED | OUTPATIENT
Start: 2025-07-24

## 2025-07-24 NOTE — TELEPHONE ENCOUNTER
Spoke with patient. She is currently on prednisone 10 mg daily for the next 4 days for COPD exacerbation and pneumonia.   She has been taking NPH 30 mg qAM while of prednisone. Advised to decrease back to NPH 20 units qAM after finishing the steroids.    Continue Novolo units TID AC meals + CF of 1 unit: 50 > 100  (Mounjaro 5 mg has been d/c'd due to side effects).    Calling her pharmacy to find out formulary preferred for her prandial insulin.  Electronically Signed: Tiffany Bennett MD

## 2025-07-24 NOTE — TELEPHONE ENCOUNTER
Patient called office, she was following up on message from yesterday. She stated she also heard from her insurance and they are no longer going to cover her insulin aspart. She said she has one pen left. She is wanting to discuss this with Dr. Bennett.

## 2025-07-24 NOTE — TELEPHONE ENCOUNTER
See message and advise.    Last office visit with prescribing clinician: 3/18/2025       Next office visit with prescribing clinician: 9/23/2025

## 2025-08-20 ENCOUNTER — TELEPHONE (OUTPATIENT)
Dept: ENDOCRINOLOGY | Facility: CLINIC | Age: 65
End: 2025-08-20
Payer: MEDICARE